# Patient Record
Sex: FEMALE | Race: WHITE | Employment: FULL TIME | URBAN - METROPOLITAN AREA
[De-identification: names, ages, dates, MRNs, and addresses within clinical notes are randomized per-mention and may not be internally consistent; named-entity substitution may affect disease eponyms.]

---

## 2017-07-27 ENCOUNTER — HOSPITAL ENCOUNTER (OUTPATIENT)
Facility: HOSPITAL | Age: 47
Setting detail: OBSERVATION
Discharge: HOME | End: 2017-07-28
Attending: EMERGENCY MEDICINE | Admitting: HOSPITALIST
Payer: COMMERCIAL

## 2017-07-27 ENCOUNTER — APPOINTMENT (OUTPATIENT)
Dept: GENERAL RADIOLOGY | Facility: HOSPITAL | Age: 47
End: 2017-07-27
Attending: EMERGENCY MEDICINE
Payer: COMMERCIAL

## 2017-07-27 ENCOUNTER — APPOINTMENT (OUTPATIENT)
Dept: INTERVENTIONAL RADIOLOGY/VASCULAR | Facility: HOSPITAL | Age: 47
End: 2017-07-27
Attending: INTERNAL MEDICINE
Payer: COMMERCIAL

## 2017-07-27 ENCOUNTER — APPOINTMENT (OUTPATIENT)
Dept: CV DIAGNOSTICS | Facility: HOSPITAL | Age: 47
End: 2017-07-27
Attending: HOSPITALIST
Payer: COMMERCIAL

## 2017-07-27 DIAGNOSIS — R07.9 ACUTE CHEST PAIN: Primary | ICD-10-CM

## 2017-07-27 DIAGNOSIS — R94.31 ABNORMAL EKG: ICD-10-CM

## 2017-07-27 LAB
ALBUMIN SERPL-MCNC: 3.5 G/DL (ref 3.5–4.8)
ALP LIVER SERPL-CCNC: 71 U/L (ref 39–100)
ALT SERPL-CCNC: 17 U/L (ref 14–54)
APTT PPP: 26.9 SECONDS (ref 25–34)
AST SERPL-CCNC: 14 U/L (ref 15–41)
BASOPHILS # BLD AUTO: 0.02 X10(3) UL (ref 0–0.1)
BASOPHILS NFR BLD AUTO: 0.3 %
BETA NATRIURETIC PEPTIDE: 17 PG/ML (ref 2–99)
BILIRUB SERPL-MCNC: 0.2 MG/DL (ref 0.1–2)
BUN BLD-MCNC: 11 MG/DL (ref 8–20)
CALCIUM BLD-MCNC: 8.9 MG/DL (ref 8.3–10.3)
CHLORIDE: 110 MMOL/L (ref 101–111)
CO2: 26 MMOL/L (ref 22–32)
CREAT BLD-MCNC: 0.94 MG/DL (ref 0.55–1.02)
D-DIMER: 0.4 UG/ML FEU (ref 0–0.49)
EOSINOPHIL # BLD AUTO: 0.04 X10(3) UL (ref 0–0.3)
EOSINOPHIL NFR BLD AUTO: 0.7 %
ERYTHROCYTE [DISTWIDTH] IN BLOOD BY AUTOMATED COUNT: 12.9 % (ref 11.5–16)
GLUCOSE BLD-MCNC: 105 MG/DL (ref 70–99)
HCT VFR BLD AUTO: 40.3 % (ref 34–50)
HGB BLD-MCNC: 13 G/DL (ref 12–16)
IMMATURE GRANULOCYTE COUNT: 0.02 X10(3) UL (ref 0–1)
IMMATURE GRANULOCYTE RATIO %: 0.3 %
INR BLD: 0.99 (ref 0.89–1.11)
LYMPHOCYTES # BLD AUTO: 2.38 X10(3) UL (ref 0.9–4)
LYMPHOCYTES NFR BLD AUTO: 38.8 %
M PROTEIN MFR SERPL ELPH: 7.3 G/DL (ref 6.1–8.3)
MCH RBC QN AUTO: 28.6 PG (ref 27–33.2)
MCHC RBC AUTO-ENTMCNC: 32.3 G/DL (ref 31–37)
MCV RBC AUTO: 88.8 FL (ref 81–100)
MONOCYTES # BLD AUTO: 0.39 X10(3) UL (ref 0.1–0.6)
MONOCYTES NFR BLD AUTO: 6.4 %
NEUTROPHIL ABS PRELIM: 3.29 X10 (3) UL (ref 1.3–6.7)
NEUTROPHILS # BLD AUTO: 3.29 X10(3) UL (ref 1.3–6.7)
NEUTROPHILS NFR BLD AUTO: 53.5 %
PLATELET # BLD AUTO: 242 10(3)UL (ref 150–450)
POTASSIUM SERPL-SCNC: 3.8 MMOL/L (ref 3.6–5.1)
PRO-BETA NATRIURETIC PEPTIDE: 95 PG/ML (ref ?–125)
PSA SERPL DL<=0.01 NG/ML-MCNC: 13.1 SECONDS (ref 12–14.3)
RBC # BLD AUTO: 4.54 X10(6)UL (ref 3.8–5.1)
RED CELL DISTRIBUTION WIDTH-SD: 41.9 FL (ref 35.1–46.3)
SED RATE-ML: 9 MM/HR (ref 0–25)
SODIUM SERPL-SCNC: 144 MMOL/L (ref 136–144)
TROPONIN: <0.046 NG/ML (ref ?–0.05)
TSI SER-ACNC: 3.14 MIU/ML (ref 0.35–5.5)
TSI SER-ACNC: 3.36 MIU/ML (ref 0.35–5.5)
WBC # BLD AUTO: 6.1 X10(3) UL (ref 4–13)

## 2017-07-27 PROCEDURE — 93306 TTE W/DOPPLER COMPLETE: CPT | Performed by: HOSPITALIST

## 2017-07-27 PROCEDURE — B2111ZZ FLUOROSCOPY OF MULTIPLE CORONARY ARTERIES USING LOW OSMOLAR CONTRAST: ICD-10-PCS | Performed by: INTERNAL MEDICINE

## 2017-07-27 PROCEDURE — 99220 INITIAL OBSERVATION CARE,LEVL III: CPT | Performed by: HOSPITALIST

## 2017-07-27 PROCEDURE — B2151ZZ FLUOROSCOPY OF LEFT HEART USING LOW OSMOLAR CONTRAST: ICD-10-PCS | Performed by: INTERNAL MEDICINE

## 2017-07-27 PROCEDURE — 71010 XR CHEST AP PORTABLE  (CPT=71010): CPT | Performed by: EMERGENCY MEDICINE

## 2017-07-27 PROCEDURE — 4A023N7 MEASUREMENT OF CARDIAC SAMPLING AND PRESSURE, LEFT HEART, PERCUTANEOUS APPROACH: ICD-10-PCS | Performed by: INTERNAL MEDICINE

## 2017-07-27 RX ORDER — MEMANTINE HYDROCHLORIDE 28 MG/1
28 CAPSULE, EXTENDED RELEASE ORAL DAILY
COMMUNITY

## 2017-07-27 RX ORDER — LIDOCAINE HYDROCHLORIDE 10 MG/ML
INJECTION, SOLUTION INFILTRATION; PERINEURAL
Status: COMPLETED
Start: 2017-07-27 | End: 2017-07-27

## 2017-07-27 RX ORDER — DIVALPROEX SODIUM 500 MG/1
500 TABLET, DELAYED RELEASE ORAL DAILY
Status: ON HOLD | COMMUNITY
End: 2017-07-27

## 2017-07-27 RX ORDER — MEMANTINE HYDROCHLORIDE 10 MG/1
28 TABLET ORAL 2 TIMES DAILY
Status: ON HOLD | COMMUNITY
End: 2017-07-27

## 2017-07-27 RX ORDER — MEMANTINE HYDROCHLORIDE 10 MG/1
10 TABLET ORAL 2 TIMES DAILY
Status: DISCONTINUED | OUTPATIENT
Start: 2017-07-28 | End: 2017-07-28

## 2017-07-27 RX ORDER — SODIUM CHLORIDE 9 MG/ML
INJECTION, SOLUTION INTRAVENOUS CONTINUOUS
Status: ACTIVE | OUTPATIENT
Start: 2017-07-27 | End: 2017-07-28

## 2017-07-27 RX ORDER — ASPIRIN 81 MG/1
324 TABLET, CHEWABLE ORAL ONCE
Status: COMPLETED | OUTPATIENT
Start: 2017-07-27 | End: 2017-07-27

## 2017-07-27 RX ORDER — ONDANSETRON 2 MG/ML
4 INJECTION INTRAMUSCULAR; INTRAVENOUS EVERY 4 HOURS PRN
Status: DISCONTINUED | OUTPATIENT
Start: 2017-07-27 | End: 2017-07-28

## 2017-07-27 RX ORDER — NITROGLYCERIN 0.4 MG/1
0.4 TABLET SUBLINGUAL EVERY 5 MIN PRN
Status: DISCONTINUED | OUTPATIENT
Start: 2017-07-27 | End: 2017-07-27

## 2017-07-27 RX ORDER — MYCOPHENOLATE MOFETIL 250 MG/1
1000 CAPSULE ORAL 2 TIMES DAILY
Status: DISCONTINUED | OUTPATIENT
Start: 2017-07-27 | End: 2017-07-28

## 2017-07-27 RX ORDER — DIVALPROEX SODIUM 500 MG/1
500 TABLET, EXTENDED RELEASE ORAL DAILY
COMMUNITY

## 2017-07-27 RX ORDER — HEPARIN SODIUM 5000 [USP'U]/ML
INJECTION, SOLUTION INTRAVENOUS; SUBCUTANEOUS
Status: COMPLETED
Start: 2017-07-27 | End: 2017-07-27

## 2017-07-27 RX ORDER — MIDAZOLAM HYDROCHLORIDE 1 MG/ML
INJECTION INTRAMUSCULAR; INTRAVENOUS
Status: COMPLETED
Start: 2017-07-27 | End: 2017-07-27

## 2017-07-27 RX ORDER — MYCOPHENOLATE MOFETIL 250 MG/1
2000 CAPSULE ORAL 2 TIMES DAILY
Status: ON HOLD | COMMUNITY
End: 2017-07-27

## 2017-07-27 RX ORDER — DIVALPROEX SODIUM 500 MG/1
500 TABLET, EXTENDED RELEASE ORAL DAILY
Status: DISCONTINUED | OUTPATIENT
Start: 2017-07-28 | End: 2017-07-28

## 2017-07-27 RX ORDER — SODIUM CHLORIDE 9 MG/ML
INJECTION, SOLUTION INTRAVENOUS CONTINUOUS
Status: ACTIVE | OUTPATIENT
Start: 2017-07-27 | End: 2017-07-27

## 2017-07-27 RX ORDER — SODIUM CHLORIDE 9 MG/ML
INJECTION, SOLUTION INTRAVENOUS CONTINUOUS
Status: DISCONTINUED | OUTPATIENT
Start: 2017-07-27 | End: 2017-07-28

## 2017-07-27 RX ORDER — MYCOPHENOLATE MOFETIL 500 MG/1
1000 TABLET ORAL 2 TIMES DAILY
COMMUNITY
End: 2019-03-07 | Stop reason: ALTCHOICE

## 2017-07-27 RX ORDER — SUMATRIPTAN 50 MG/1
50 TABLET, FILM COATED ORAL SEE ADMIN INSTRUCTIONS
COMMUNITY

## 2017-07-27 RX ORDER — PAROXETINE HYDROCHLORIDE 20 MG/1
25 TABLET, FILM COATED ORAL EVERY MORNING
Status: ON HOLD | COMMUNITY
End: 2017-07-27

## 2017-07-27 RX ORDER — PAROXETINE HYDROCHLORIDE 20 MG/1
20 TABLET, FILM COATED ORAL DAILY
Status: DISCONTINUED | OUTPATIENT
Start: 2017-07-28 | End: 2017-07-28

## 2017-07-27 RX ORDER — PAROXETINE HYDROCHLORIDE 25 MG/1
25 TABLET, FILM COATED, EXTENDED RELEASE ORAL DAILY
COMMUNITY

## 2017-07-27 NOTE — ED PROVIDER NOTES
Patient Seen in: [facility] Emergency Department    History   Patient presents with:  Chest Pain Angina (cardiovascular)    Stated Complaint: cp    HPI    Patient is a pleasant 50-year-old female, presenting for evaluation of chest pain and dyspnea. chart.   GENERAL: Patient is awake and alert, resting comfortably on the cart, in no apparent distress. HEENT: Head is without evidence of trauma. Extraocular muscles are intact. Pupils are equal and reactive to light.  Oropharynx is pink and moist.  NECK approximately 65- 104 seconds. The therapeutic range has been validated against 0.3-0.7 heparin anti-Xa units/mL. PROTHROMBIN TIME (PT) - Normal   CBC W/ DIFFERENTIAL - Normal   CBC WITH DIFFERENTIAL WITH PLATELET    Narrative:      The following orders and nitroglycerin was administered. Patient was pain-free after receiving the nitroglycerin. Results of the patient's laboratory and radiology studies were reviewed and discussed with the patient.   Patient has an extensive family history of cardiac dis

## 2017-07-27 NOTE — PROGRESS NOTES
[facility]  [facility]  Cath Note    Cece Carlos Location: Cath lab     MRN [id number]   Admission Date 7/27/2017 Operation Date 7/27/2017   Attending Physician Samuel Gilbert MD Operating Physician Kathy Melchor MD     Pre-Cath [facility] Presume

## 2017-07-27 NOTE — PLAN OF CARE
Received patient at [phone], and pt was immediately picked up and taken to cath lab  States she has mild chest pain, \"like a band wrapped around my chest\"  Plan for ECHO later today  Plan for CTA chest in the AM    1430 Patient returns from cath lab  No hem

## 2017-07-27 NOTE — ED INITIAL ASSESSMENT (HPI)
chest pain onset 730 this am felt like a tight band across my chest lasted 10 min. Now she has a heaviness in her throat and its difficult to swallow.   The pain woke her up

## 2017-07-27 NOTE — H&P
[facility] HOSPITAL[facility]  History and Physical     Tanvi Breckenridge Patient Status:  Observation    1970 MRN [id number]   [facility] 2NE-A Attending Anabelle Suggs MD   Hosp Day # 0 STEVEN OZUNA     Chief Complaint: chest pain    H file prior to encounter. Review of Systems:   A comprehensive 14 point review of systems was completed. Pertinent positives and negatives noted in the HPI.     Physical Exam:    /77 (BP Location: Right arm)   Pulse 79   Temp 98.5 °F (36.9 °C) (O full  · Chavez: no    Plan of care discussed with patient    Lori Dang MD  7/27/2017

## 2017-07-27 NOTE — CONSULTS
[facility]  Report of Consultation    Gita Sanderson Patient Status:  Emergency    1970 MRN [id number]   Location [address] [facility] Attending Marva De Luna, Maren Scheuermann, MD   Hosp Day # 0 PCP SULMA     Reason for [facility] 100 %. Temp (24hrs), Av °F (36.7 °C), Min:98 °F (36.7 °C), Max:98 °F (36.7 °C)    Wt Readings from Last 3 Encounters:  17 : 165 lb (74.8 kg)      Telemetry: sr  General: Alert and oriented in no apparent distress. HEENT: No focal deficits.   Nec

## 2017-07-28 ENCOUNTER — APPOINTMENT (OUTPATIENT)
Dept: CT IMAGING | Facility: HOSPITAL | Age: 47
End: 2017-07-28
Attending: HOSPITALIST
Payer: COMMERCIAL

## 2017-07-28 VITALS
SYSTOLIC BLOOD PRESSURE: 117 MMHG | OXYGEN SATURATION: 100 % | HEIGHT: 67 IN | BODY MASS INDEX: 26.71 KG/M2 | DIASTOLIC BLOOD PRESSURE: 78 MMHG | RESPIRATION RATE: 18 BRPM | WEIGHT: 170.19 LBS | HEART RATE: 88 BPM | TEMPERATURE: 98 F

## 2017-07-28 LAB
ATRIAL RATE: 96 BPM
BASOPHILS # BLD AUTO: 0.03 X10(3) UL (ref 0–0.1)
BASOPHILS NFR BLD AUTO: 0.5 %
BUN BLD-MCNC: 10 MG/DL (ref 8–20)
CALCIUM BLD-MCNC: 8.3 MG/DL (ref 8.3–10.3)
CHLORIDE: 111 MMOL/L (ref 101–111)
CHOLEST SMN-MCNC: 169 MG/DL (ref ?–200)
CO2: 26 MMOL/L (ref 22–32)
CREAT BLD-MCNC: 0.91 MG/DL (ref 0.55–1.02)
EOSINOPHIL # BLD AUTO: 0.07 X10(3) UL (ref 0–0.3)
EOSINOPHIL NFR BLD AUTO: 1.1 %
ERYTHROCYTE [DISTWIDTH] IN BLOOD BY AUTOMATED COUNT: 13 % (ref 11.5–16)
GLUCOSE BLD-MCNC: 88 MG/DL (ref 70–99)
HAV IGM SER QL: 2 MG/DL (ref 1.7–3)
HCT VFR BLD AUTO: 37 % (ref 34–50)
HDLC SERPL-MCNC: 64 MG/DL (ref 45–?)
HDLC SERPL: 2.64 {RATIO} (ref ?–4.44)
HGB BLD-MCNC: 11.8 G/DL (ref 12–16)
IMMATURE GRANULOCYTE COUNT: 0.01 X10(3) UL (ref 0–1)
IMMATURE GRANULOCYTE RATIO %: 0.2 %
LDLC SERPL CALC-MCNC: 85 MG/DL (ref ?–130)
LDLC SERPL-MCNC: 20 MG/DL (ref 5–40)
LIPOPROTEIN (A): 3 MG/DL
LYMPHOCYTES # BLD AUTO: 2.54 X10(3) UL (ref 0.9–4)
LYMPHOCYTES NFR BLD AUTO: 38.5 %
MCH RBC QN AUTO: 28.5 PG (ref 27–33.2)
MCHC RBC AUTO-ENTMCNC: 31.9 G/DL (ref 31–37)
MCV RBC AUTO: 89.4 FL (ref 81–100)
MONOCYTES # BLD AUTO: 0.57 X10(3) UL (ref 0.1–0.6)
MONOCYTES NFR BLD AUTO: 8.6 %
NEUTROPHIL ABS PRELIM: 3.37 X10 (3) UL (ref 1.3–6.7)
NEUTROPHILS # BLD AUTO: 3.37 X10(3) UL (ref 1.3–6.7)
NEUTROPHILS NFR BLD AUTO: 51.1 %
NONHDLC SERPL-MCNC: 105 MG/DL (ref ?–130)
P AXIS: 74 DEGREES
P-R INTERVAL: 112 MS
PLATELET # BLD AUTO: 221 10(3)UL (ref 150–450)
POTASSIUM SERPL-SCNC: 3.8 MMOL/L (ref 3.6–5.1)
Q-T INTERVAL: 358 MS
QRS DURATION: 96 MS
QTC CALCULATION (BEZET): 452 MS
R AXIS: 65 DEGREES
RBC # BLD AUTO: 4.14 X10(6)UL (ref 3.8–5.1)
RED CELL DISTRIBUTION WIDTH-SD: 42.4 FL (ref 35.1–46.3)
SODIUM SERPL-SCNC: 144 MMOL/L (ref 136–144)
T AXIS: 5 DEGREES
TRIGLYCERIDES: 98 MG/DL (ref ?–150)
VENTRICULAR RATE: 96 BPM
WBC # BLD AUTO: 6.6 X10(3) UL (ref 4–13)

## 2017-07-28 PROCEDURE — 71275 CT ANGIOGRAPHY CHEST: CPT | Performed by: HOSPITALIST

## 2017-07-28 PROCEDURE — 99217 OBSERVATION CARE DISCHARGE: CPT | Performed by: HOSPITALIST

## 2017-07-28 RX ORDER — POTASSIUM CHLORIDE 20 MEQ/1
40 TABLET, EXTENDED RELEASE ORAL ONCE
Status: COMPLETED | OUTPATIENT
Start: 2017-07-28 | End: 2017-07-28

## 2017-07-28 NOTE — DISCHARGE SUMMARY
[facility] [place] HOSPITALIST  DISCHARGE SUMMARY     [address] Br[address] Carrillo Patient Status:  Observation    1970 MRN [id number]   [facility] 2NE-A Attending No att. providers found   Hosp Day # 0 PCP VAL [address]     Date of Admission: 2017  Matheus disease.         Brief Synopsis: Patient is a 54-year-old female admitted with acute onset of chest pain. She has significant family history of coronary artery disease.   She is found to have a EKG with ST-T wave abnormality and she was taken to the Cath L (111-128)/(68-84) 117/78    Physical Exam:    General: No acute distress. Respiratory: Clear to auscultation bilaterally. No wheezes. No rhonchi. Cardiovascular: S1, S2. Regular rate and rhythm. No murmurs, rubs or gallops.    Abdomen: Soft, nontender, n

## 2017-07-28 NOTE — PAYOR COMM NOTE
--------------  ADMISSION REVIEW     Payor: [id number] #:  [id number]  Authorization Number: N/A    Admit date: N/A  Admit time: N/A       Admitting Physician: Tha Smith MD  Attending Physician:  Tha Smith MD  Primary Care Phys ug/mL (FEU). Proceed with caution from clinical presentation. PTT, ACTIVATED - Normal    Narrative: The aPTT Heparin Therapeutic Range is approximately 65- 104 seconds.  The therapeutic range has been validated against 0.3-0.7 heparin anti-Xa unit Ceasar Meyers MD      mycophenolate mofetil (CELLCEPT) cap 1,000 mg     Date Action Dose Route User    7/27/2017 2001 Given 1000 mg Oral Shayna Feliz RN      nitroGLYCERIN (NITROSTAT) SL tab 0.4 mg     Date Action Dose Route User    7/27/2017 1134 Given 0.4 m

## 2017-07-28 NOTE — PROCEDURES
[facility]    PATIENT'S NAME: Denise Doty   ATTENDING PHYSICIAN: Bhumi Melton M.D. OPERATING PHYSICIAN: Foy Jeans, M.D.    PATIENT ACCOUNT#:   [de-identified]    LOCATION:  [address] [place] [address]  MEDICAL RECORD #:   [id number]       DATE OF BIRTH: circumflex coronary distributions. There are minimal plaques noted in the proximal and mid LAD without any associated significant luminal stenosis. The ramus intermedius is a large artery. The circumflex is large and dominant.   The right coronary artery

## 2017-07-28 NOTE — PLAN OF CARE
Patient/Family Goals    • Patient/Family Long Term Goal Progressing    • Patient/Family Short Term Goal Progressing          Past HX: asthma, lupus, migraines, pericarditis    Patient came in to the ER with chest pain that was relieved with nitro.  EKG show

## 2017-07-28 NOTE — PLAN OF CARE
Patient/Family Goals    • Patient/Family Long Term Goal Progressing    • Patient/Family Short Term Goal Progressing            Received patient this am aaox3  SR on tele  Room air, no cough  Right groin c/d/i= soft   +2pp    Plan for d.c to home today  Upd

## 2017-07-28 NOTE — PROGRESS NOTES
Patient seen and examined    S- no complaints, no further chest pain    General- NAD  Chest-   CTAB  [facility]_ RRR  abdo- soft, Nt, bS+    Imp  Chest pain- not cardiac   Cath negative for significant CAD   ECHO reviewed   Asthma- stable  Lupus- stable    Plan

## 2017-07-28 NOTE — PROGRESS NOTES
[facility]  Cardiology Progress Note    Alana Folds Patient Status:  Observation    1970 MRN [id number]   [facility] 2NE-A Attending Karla Garcia MD   Hosp Day # 0 PCP VAL MEZA     Subjective:  Doing well with no co • sodium chloride         Assessment:    · Chest pain, LVEF 60%, cath with no significant CAD  · Family Hx CAD   · Asthma  · Lupus   · Lipids- LDL 85, Trigs 98, HDL 64     Plan:     · Ongoing risk factor modification  · Stable for discharge from a card

## 2024-06-13 ENCOUNTER — HOSPITAL ENCOUNTER (EMERGENCY)
Facility: HOSPITAL | Age: 54
Discharge: LEFT BEFORE DISCHARGE | End: 2024-06-13
Payer: COMMERCIAL

## 2024-06-13 VITALS
RESPIRATION RATE: 18 BRPM | HEIGHT: 67 IN | WEIGHT: 180 LBS | BODY MASS INDEX: 28.25 KG/M2 | OXYGEN SATURATION: 96 % | SYSTOLIC BLOOD PRESSURE: 103 MMHG | HEART RATE: 83 BPM | TEMPERATURE: 100 F | DIASTOLIC BLOOD PRESSURE: 71 MMHG

## 2024-06-13 RX ORDER — ASPIRIN 81 MG/1
81 TABLET ORAL DAILY
COMMUNITY

## 2024-06-14 NOTE — ED INITIAL ASSESSMENT (HPI)
Pt to ED stating that she was sent from Physician's IC for further evaluation for concerns of Hypertension. Pt states prior to IC visit she had a right-sided epistaxis, but was was resolved prior to her arrival to the Immediate Care. \"I have a feeling it was higher before I went because of the severity of my headache and my bloody nose.\"     Pt's BP at 1859 noted per her DC papers at IC - 121/74. Pt states she was given Tylenol 2 tabs at IC (unable to recall the dose).    Hx of Migraines. Pt also c/o headache, fever - TMax 102 F and N/V-1x since yesterday.

## 2025-02-09 ENCOUNTER — HOSPITAL ENCOUNTER (EMERGENCY)
Facility: HOSPITAL | Age: 55
Discharge: HOME | End: 2025-02-09
Attending: EMERGENCY MEDICINE
Payer: COMMERCIAL

## 2025-02-09 VITALS
TEMPERATURE: 98 F | HEIGHT: 67 IN | BODY MASS INDEX: 26.68 KG/M2 | RESPIRATION RATE: 17 BRPM | SYSTOLIC BLOOD PRESSURE: 133 MMHG | HEART RATE: 59 BPM | OXYGEN SATURATION: 98 % | WEIGHT: 170 LBS | DIASTOLIC BLOOD PRESSURE: 69 MMHG

## 2025-02-09 DIAGNOSIS — B02.9 HERPES ZOSTER WITHOUT COMPLICATION: Primary | ICD-10-CM

## 2025-02-09 DIAGNOSIS — B02.30 HERPES ZOSTER WITH OPHTHALMIC COMPLICATION, UNSPECIFIED HERPES ZOSTER EYE DISEASE: ICD-10-CM

## 2025-02-09 PROCEDURE — 99283 EMERGENCY DEPT VISIT LOW MDM: CPT

## 2025-02-09 PROCEDURE — 99284 EMERGENCY DEPT VISIT MOD MDM: CPT

## 2025-02-09 RX ORDER — TETRACAINE HYDROCHLORIDE 5 MG/ML
SOLUTION OPHTHALMIC
Status: DISCONTINUED
Start: 2025-02-09 | End: 2025-02-09 | Stop reason: WASHOUT

## 2025-02-09 RX ORDER — FAMCICLOVIR 500 MG/1
500 TABLET ORAL 3 TIMES DAILY
Qty: 21 TABLET | Refills: 0 | Status: SHIPPED | OUTPATIENT
Start: 2025-02-09 | End: 2025-02-09

## 2025-02-09 RX ORDER — VALACYCLOVIR HYDROCHLORIDE 1 G/1
1000 TABLET, FILM COATED ORAL 3 TIMES DAILY
Qty: 21 TABLET | Refills: 0 | Status: SHIPPED | OUTPATIENT
Start: 2025-02-09 | End: 2025-02-16

## 2025-02-09 NOTE — ED INITIAL ASSESSMENT (HPI)
Pt c/o rash to the face since Friday. Went to  and was told it was likely shingles, sent for further eval \"due to it being so close to my eye they didn't know if there was any involvement with the cornea.\" Pt c/o pain to the right eye, denies vision changes.

## 2025-02-09 NOTE — DISCHARGE INSTRUCTIONS
Follow-up with your primary MD for ophthalmology referral.  If you have any pain in your eye or significant discomfort return to the emergency room.      You were seen in the emergency room in a limited time.  There is a possibility that although we do not see any acute process at this present time that things can change with time.  Is therefore imperative that you follow-up with primary care physician for close follow-up.  If there is any significant progression of your pain  or other symptoms you to return immediately to the emergency room.    Go to the [place] office call tomorrow the ophthalmologist will see you tomorrow.  I do not you have the contacts.  Use of glasses use the glasses.  Use the glasses.  Do not throw away the contact through with the contacts.  Until cleared by ophthalmology.  Return if any vision changes or pain or discomfort in your eye.        You were seen in the emergency room in a limited time.  There is a possibility that although we do not see any acute process at this present time that things can change with time.  Is therefore imperative that you follow-up with primary care physician for close follow-up.  If there is any significant progression of your pain  or other symptoms you to return immediately to the emergency room.

## 2025-02-09 NOTE — ED PROVIDER NOTES
Patient Seen in: [facility] Emergency Department      History     Chief Complaint   Patient presents with    Shingles     Stated Complaint: shingles to eye    Subjective:   HPI      This is a 54-year-old female presents with a rash to her face since Friday.  The patient's rashes on the right side of face went to the urgent care and was told that she has shingles was sent here for further evaluation.  She denies any vision changes double vision or pain in her eye.  She has a history of lupus.  The patient has no pain in her vision in her eye and no redness to the eye.  She was sent here to rule out any involvement of the cornea.  No other specific complaints.  She states is not any immunosuppressive from her lupus she was previously on steroids but at present not on steroids or any immunosuppressive's.    Objective:     Past Medical History:    Asthma (HCC)    Lupus    Migraines              Past Surgical History:   Procedure Laterality Date    Diskectomy, anterior, with d      L5S1    Total abdom hysterectomy                  Social History     Socioeconomic History    Marital status:    Tobacco Use    Smoking status: Never                  Physical Exam     ED Triage Vitals [02/09/25 1310]   /69   Pulse 59   Resp 17   Temp 98.1 °F (36.7 °C)   Temp src Temporal   SpO2 98 %   O2 Device None (Room air)       Current Vitals:   Vital Signs  BP: 133/69  Pulse: 59  Resp: 17  Temp: 98.1 °F (36.7 °C)  Temp src: Temporal    Oxygen Therapy  SpO2: 98 %  O2 Device: None (Room air)        Physical Exam     General: Patient has a shingles lesions on the V1 distribution.  It does not seem to involve the eye there is no redness or erythema.  Pupils equal reactive EXTR muscles are intact.  Vesicular lesions over the right forehead, and the V1 distribution.  No other lesions noted neurologically intact.  The patient is in no respiratory distress    HEENT: There is no signs of trauma.  Oral mucosa is wet.    Lungs:  Clear to auscultation without wheezing or retractions    Cardiovascular: Regular without murmurs    Extremities: Good pulses bilaterally.      Neuro: Alert and oriented.  The patient is moving all extremities there is no focal findings.  ED Course   Labs Reviewed - No data to display     Visual acuities were done.  Slit-lamp examination will be done to rule out any involvement of the eye      patient has what seems to be is 3 small areas approximate 6:00 where there is a punctate lesions noted on the right cornea.  At around 6:00.       [facility]     Discussed this case with ophthalmologist on-call Dr. Parkinson.  Who recommended patient be started on oral antivirals and she will see the patient tomorrow.  She did not feel like we need to start her and that the patient has no change in her vision she had visual acuities done at the urgent care she has now, glasses on and her old the patient visual acuity is 20/40 in the right, 20/30 on the left.  She has her old glasses in there for her but she says her vision is not significant change he does not have new glasses with her..  Reports her glasses are old she her vision is 20/50 in both eyes.  Which she says is normal for her.  She has only old glasses.  She feels comfortable going home we discussed she has any significant pain or discomfort return her.  Patient will be seen at the [place] office by the ophthalmologist tomorrow.      Medical Decision Making      Disposition and Plan     Clinical Impression:  1. Herpes zoster without complication    2. Herpes zoster with ophthalmic complication, unspecified herpes zoster eye disease         Disposition:  Discharge  2/9/2025  2:10 pm    Follow-up:  Timbo Bundy  [address]  [address]  [facility] [place] [address] [phone]    Follow up in 2 day(s)      Vivi Parkinson MD  [address]  [address]  [facility] [address] [phone]    Follow up in 2 day(s)            Medications Prescribed:  Discharge Medication List as  of 2/9/2025  2:16 PM        START taking these medications    Details   valACYclovir 1 G Oral Tab Take 1 tablet (1,000 mg total) by mouth 3 (three) times daily for 7 days., Normal, Disp-21 tablet, R-0                 Supplementary Documentation:

## (undated) NOTE — LETTER
[facility]  Paul Horne 60 [address] [facility], [address] [place] [address]    Consent for Operation    Date: __________________    Time: _______________    1.  I authorize the performance upon Thad Humphreys the following operation:    Cardiac catheterization, left ve procedures to be performed, including appropriate portions of my body for medical, scientific, or educational purposes, provided my identity is not revealed by the pictures or by descriptive texts accompanying them.  If the procedure has been videotaped, th ends for purposes of reinstating the Do Not Resuscitate order.     I CERTIFY THAT I HAVE READ AND UNDERSTAND THE ABOVE CONSENT TO OPERATION, THAT MY DOCTOR PROVIDED ME WITH THE ABOVE EXPLANATIONS, THAT ALL BLANKS OR STATEMENTS REQUIRING INSERTION OR COMPLET patrizia Nunez my anesthesia doctor before my procedure:  · If I am pregnant. · The last time that I ate or drank.   · All of the medicines I take (including prescriptions, herbal supplements, and pills I can buy without a prescription (including street drugs/ill _____________________________________________________________________________   Name (if used)    Language/Organization   Time    _____________________________________________________________________________  Anesthesiologist Signature     Date